# Patient Record
Sex: FEMALE | URBAN - METROPOLITAN AREA
[De-identification: names, ages, dates, MRNs, and addresses within clinical notes are randomized per-mention and may not be internally consistent; named-entity substitution may affect disease eponyms.]

---

## 2021-01-14 ENCOUNTER — OFFICE VISIT (OUTPATIENT)
Dept: URGENT CARE | Age: 41
End: 2021-01-14

## 2021-01-14 VITALS — HEART RATE: 60 BPM | OXYGEN SATURATION: 96 % | TEMPERATURE: 98.6 F | RESPIRATION RATE: 15 BRPM

## 2021-01-14 DIAGNOSIS — Z11.52 ENCOUNTER FOR SCREENING FOR COVID-19: Primary | ICD-10-CM

## 2021-01-14 RX ORDER — LEVOTHYROXINE SODIUM 50 UG/1
75 TABLET ORAL
COMMUNITY

## 2021-01-16 LAB — SARS-COV-2, NAA: NOT DETECTED

## 2021-10-10 ENCOUNTER — OFFICE VISIT (OUTPATIENT)
Dept: URGENT CARE | Age: 41
End: 2021-10-10
Payer: COMMERCIAL

## 2021-10-10 VITALS — HEART RATE: 84 BPM | RESPIRATION RATE: 20 BRPM | TEMPERATURE: 98.2 F | OXYGEN SATURATION: 97 %

## 2021-10-10 DIAGNOSIS — Z20.822 SUSPECTED COVID-19 VIRUS INFECTION: Primary | ICD-10-CM

## 2021-10-10 LAB — SARS-COV-2 POC: NEGATIVE

## 2021-10-10 PROCEDURE — 99212 OFFICE O/P EST SF 10 MIN: CPT | Performed by: FAMILY MEDICINE

## 2021-10-10 PROCEDURE — 87426 SARSCOV CORONAVIRUS AG IA: CPT | Performed by: FAMILY MEDICINE

## 2021-10-10 NOTE — PROGRESS NOTES
This patient was seen at [address] [place] [address] [facility]gent [facility] while in their vehicle due to COVID-19 pandemic with PPE and focused examination in order to decrease community viral transmission. The patient/guardian gave verbal consent to treat. The history is provided by the patient. Nasal Congestion  This is a new problem. The current episode started 2 days ago. The problem occurs constantly. The problem has not changed since onset. Associated symptoms include headaches. Pertinent negatives include no shortness of breath. Associated symptoms comments: Scratchy throat, stuffy nose, sinus congestion . Nothing relieves the symptoms. She has tried nothing for the symptoms. No past medical history on file. No past surgical history on file. No family history on file. Social History     Socioeconomic History    Marital status: UNKNOWN     Spouse name: Not on file    Number of children: Not on file    Years of education: Not on file    Highest education level: Not on file   Occupational History    Not on file   Tobacco Use    Smoking status: Never Smoker    Smokeless tobacco: Never Used   Substance and Sexual Activity    Alcohol use: Not on file    Drug use: Not on file    Sexual activity: Not on file   Other Topics Concern    Not on file   Social History Narrative    Not on file     Social Determinants of Health     Financial Resource Strain:     Difficulty of Paying Living Expenses:    Food Insecurity:     Worried About Running Out of Food in the Last Year:     [address] in the Last Year:    Transportation Needs:     Lack of Transportation (Medical):      Lack of Transportation (Non-Medical):    Physical Activity:     Days of Exercise per Week:     Minutes of Exercise per Session:    Stress:     Feeling of Stress :    Social Connections:     Frequency of Communication with Friends and Family:     Frequency of Social Gatherings with Friends and Family:     Attends [employer] orth[employer] Services:     Active Member of Clubs or Organizations:     Attends Club or Organization Meetings:     Marital Status:    Intimate Partner Violence:     Fear of Current or Ex-Partner:     Emotionally Abused:     Physically Abused:     Sexually Abused: ALLERGIES: Peanut    Review of Systems   HENT: Positive for congestion, postnasal drip, sinus pressure and sore throat. Negative for sinus pain. Respiratory: Negative for shortness of breath. Neurological: Positive for headaches. All other systems reviewed and are negative. Vitals:    10/10/21 0925   Pulse: 84   Resp: 20   Temp: 98.2 °F (36.8 °C)   SpO2: 97%       Physical Exam  Vitals and nursing note reviewed. Constitutional:       General: She is not in acute distress. Appearance: She is not ill-appearing. Pulmonary:      Effort: Pulmonary effort is normal. No respiratory distress. Breath sounds: No wheezing. MDM    Procedures      ICD-10-CM ICD-9-CM    1. Suspected COVID-19 virus infection  Z20.822 V01.79 AMB POC SARS-COV-2     No orders of the defined types were placed in this encounter. Results for orders placed or performed in visit on 10/10/21   AMB POC SARS-COV-2   Result Value Ref Range    SARS-COV-2 POC Negative Negative     The patients condition was discussed with the patient and they understand. The patient is to follow up with primary care doctor. If signs and symptoms become worse the pt is to go to the ER. The patient is to take medications as prescribed.

## 2021-11-29 ENCOUNTER — OFFICE VISIT (OUTPATIENT)
Dept: URGENT CARE | Age: 41
End: 2021-11-29
Payer: COMMERCIAL

## 2021-11-29 VITALS — OXYGEN SATURATION: 97 % | RESPIRATION RATE: 16 BRPM | TEMPERATURE: 98 F | HEART RATE: 81 BPM

## 2021-11-29 DIAGNOSIS — Z20.822 EXPOSURE TO COVID-19 VIRUS: Primary | ICD-10-CM

## 2021-11-29 LAB — SARS-COV-2 POC: NEGATIVE

## 2021-11-29 PROCEDURE — 87426 SARSCOV CORONAVIRUS AG IA: CPT | Performed by: FAMILY MEDICINE

## 2021-11-29 PROCEDURE — 99212 OFFICE O/P EST SF 10 MIN: CPT | Performed by: FAMILY MEDICINE

## 2021-11-29 NOTE — PROGRESS NOTES
Subjective: (As above and below)       This patient was seen in [facility] at [address] [place] [address] [facility]gent [facility] while in their vehicle due to COVID-19 pandemic with PPE and focused examination in order to decrease community viral transmission. The patient/guardian gave verbal consent to treat. Chief Complaint   Patient presents with   Yadira Tatum Testing     exposure on 11/24/21     Enrike Clemens is a 39 y.o. female who presents for COVID-19 Exposure and testing. Currently has not tried any therapies. Feels well and denies any symptoms at this point in time. Known Exposure to COVID-19: yes      Review of Systems - negative except as listed above    Reviewed PmHx, RxHx, FmHx, SocHx, AllgHx and updated in chart. History reviewed. No pertinent family history. History reviewed. No pertinent past medical history. Social History     Socioeconomic History    Marital status: UNKNOWN   Tobacco Use    Smoking status: Never Smoker    Smokeless tobacco: Never Used          Current Outpatient Medications   Medication Sig    levothyroxine (SYNTHROID) 50 mcg tablet 75 mcg. No current facility-administered medications for this visit. Objective:     Vitals:    11/29/21 0951   Pulse: 81   Resp: 16   Temp: 98 °F (36.7 °C)   SpO2: 97%       Physical Exam  General appearance - appears well hydrated and does not appear toxic, no acute distress  Eyes - EOMs intact. Non injected. Ears - no external swelling  Neck/Lymphatics - no obvious swelling  Chest - normal breathing effort. No active cough heard. No audible wheezing. Heart - HR-see vitals  Skin - no observable rashes or pallor  Neurologic- alert and oriented x 3  Psychiatric- normal mood, behavior and though content. Assessment/ Plan:     1.  Exposure to COVID-19 virus    - AMB POC SARS-COV-2      Will test for COVID-19 given exposure  Rapid covid 19 test result: NEGATIVE  Supportive home care for any development of mild symptoms - tylenol, maintain adequate fluid intake, deep breathing exercises  Self isolate/quarantine advised based on recommendations in current CDC guidelines. Follow up:   Follow up as needed for any new, worsening or changes in symptoms        Negar Montano NP

## 2023-05-11 RX ORDER — LEVOTHYROXINE SODIUM 0.05 MG/1
TABLET ORAL
COMMUNITY